# Patient Record
Sex: FEMALE | Race: WHITE | URBAN - METROPOLITAN AREA
[De-identification: names, ages, dates, MRNs, and addresses within clinical notes are randomized per-mention and may not be internally consistent; named-entity substitution may affect disease eponyms.]

---

## 2020-07-10 ENCOUNTER — OFFICE VISIT (OUTPATIENT)
Dept: URBAN - METROPOLITAN AREA SURGERY CENTER 18 | Facility: SURGERY CENTER | Age: 60
End: 2020-07-10
Payer: MEDICAID

## 2020-07-10 DIAGNOSIS — Z86.010 H/O ADENOMATOUS POLYP OF COLON: ICD-10-CM

## 2020-07-10 PROCEDURE — G8907 PT DOC NO EVENTS ON DISCHARG: HCPCS | Performed by: INTERNAL MEDICINE

## 2020-07-10 PROCEDURE — G0105 COLORECTAL SCRN; HI RISK IND: HCPCS | Performed by: INTERNAL MEDICINE

## 2020-07-10 PROCEDURE — G9936 PMH PLYP/NEO CO/RECT/JUN/ANS: HCPCS | Performed by: INTERNAL MEDICINE

## 2022-04-27 ENCOUNTER — APPOINTMENT (RX ONLY)
Dept: URBAN - METROPOLITAN AREA OTHER 9 | Facility: OTHER | Age: 62
Setting detail: DERMATOLOGY
End: 2022-04-27

## 2022-04-27 DIAGNOSIS — L72.8 OTHER FOLLICULAR CYSTS OF THE SKIN AND SUBCUTANEOUS TISSUE: ICD-10-CM

## 2022-04-27 DIAGNOSIS — L24 IRRITANT CONTACT DERMATITIS: ICD-10-CM

## 2022-04-27 PROBLEM — L24.9 IRRITANT CONTACT DERMATITIS, UNSPECIFIED CAUSE: Status: ACTIVE | Noted: 2022-04-27

## 2022-04-27 PROCEDURE — ? PRESCRIPTION MEDICATION MANAGEMENT

## 2022-04-27 PROCEDURE — ? COUNSELING

## 2022-04-27 PROCEDURE — ? OBSERVATION AND MEASURE

## 2022-04-27 PROCEDURE — ? PRESCRIPTION

## 2022-04-27 PROCEDURE — 99203 OFFICE O/P NEW LOW 30 MIN: CPT

## 2022-04-27 RX ORDER — FLUOCINONIDE 0.5 MG/G
OINTMENT TOPICAL BID
Qty: 60 | Refills: 0 | Status: ERX | COMMUNITY
Start: 2022-04-27

## 2022-04-27 RX ADMIN — FLUOCINONIDE: 0.5 OINTMENT TOPICAL at 00:00

## 2022-04-27 ASSESSMENT — LOCATION ZONE DERM
LOCATION ZONE: LEG
LOCATION ZONE: FACE

## 2022-04-27 ASSESSMENT — LOCATION DETAILED DESCRIPTION DERM
LOCATION DETAILED: RIGHT DISTAL PRETIBIAL REGION
LOCATION DETAILED: RIGHT MEDIAL ZYGOMA

## 2022-04-27 ASSESSMENT — LOCATION SIMPLE DESCRIPTION DERM
LOCATION SIMPLE: RIGHT ZYGOMA
LOCATION SIMPLE: RIGHT PRETIBIAL REGION

## 2022-04-27 NOTE — HPI: RASH
What Type Of Note Output Would You Prefer (Optional)?: Standard Output
How Severe Is Your Rash?: mild
Is This A New Presentation, Or A Follow-Up?: Rash
Additional History: Previously evaluated lesion nearby by Dr. Tolu White

## 2022-04-27 NOTE — PROCEDURE: PRESCRIPTION MEDICATION MANAGEMENT
Plan: Ice lesion when itchy. Patient instructed to keep skin cool and avoid wearing tight pants to prevent friction.
Detail Level: Zone
Initiate Treatment: Fluocinonide ointment
Render In Strict Bullet Format?: No

## 2022-04-27 NOTE — HPI: CYST
How Severe Is Your Cyst?: moderate
Is This A New Presentation, Or A Follow-Up?: Cyst
Additional History: I&D previously performed by Dr. Tolu White. Lesion has reoccurred.

## 2022-05-16 ENCOUNTER — APPOINTMENT (RX ONLY)
Dept: URBAN - METROPOLITAN AREA CLINIC 12 | Facility: CLINIC | Age: 62
Setting detail: DERMATOLOGY
End: 2022-05-16

## 2022-05-16 DIAGNOSIS — Z41.1 ENCOUNTER FOR COSMETIC SURGERY: ICD-10-CM

## 2022-05-16 DIAGNOSIS — L72.8 OTHER FOLLICULAR CYSTS OF THE SKIN AND SUBCUTANEOUS TISSUE: ICD-10-CM

## 2022-05-16 PROCEDURE — ? PATIENT SPECIFIC COUNSELING

## 2022-05-16 PROCEDURE — ? CONSULTATION - BLEPHAROPLASTY

## 2022-05-16 PROCEDURE — ? OTHER (COSMETIC)

## 2022-05-16 PROCEDURE — ? COUNSELING EXCISION - BENIGN

## 2022-05-16 ASSESSMENT — LOCATION DETAILED DESCRIPTION DERM
LOCATION DETAILED: RIGHT CENTRAL EYEBROW
LOCATION DETAILED: LEFT CENTRAL EYEBROW
LOCATION DETAILED: RIGHT INFERIOR TEMPLE

## 2022-05-16 ASSESSMENT — LOCATION SIMPLE DESCRIPTION DERM
LOCATION SIMPLE: RIGHT TEMPLE
LOCATION SIMPLE: LEFT EYEBROW
LOCATION SIMPLE: RIGHT EYEBROW

## 2022-05-16 ASSESSMENT — LOCATION ZONE DERM: LOCATION ZONE: FACE

## 2022-05-16 NOTE — PROCEDURE: PATIENT SPECIFIC COUNSELING
Other (Free Text): Patient present today for cyst removal consultation on right eye. Patient reports she has recently had a biopsy done and results came back benign. Patient reports area started with one lesion and grew into two lesions. Patient also reports she is interest in cosmetic procedures for aesthetic appearance of her eyes. Patient does wear glasses and has some dry eye. Dr. Watters examined patient and discussed procedure options. Dr. Watters explained and mentioned to patient incision site for cysts. Dr. Watters discussed cosmetic options, including blepharoplasty, brow lift. Dr. Watters did mention how a procedure for the overall skin on face and also injectables may help before jumping into surgery because patient was concerned of wrinkles. Patient understood and has no questions on healing or procedure. Radha stepped in to give the patient a quote and schedule.
Detail Level: Simple

## 2022-05-16 NOTE — PROCEDURE: CONSULTATION - BLEPHAROPLASTY
Detail Level: Detailed
Send Procedure Quote As Charge: No
Consultation Charge $ (Use Numbers Only, No Text Please.): 125

## 2022-05-16 NOTE — PROCEDURE: OTHER (COSMETIC)
Price $ (Use Numbers Only, No Text Please.): 0.00
Sticky Other (Free Text): Consultation
Detail Level: Zone

## 2022-10-18 ENCOUNTER — APPOINTMENT (RX ONLY)
Dept: URBAN - METROPOLITAN AREA OTHER 9 | Facility: OTHER | Age: 62
Setting detail: DERMATOLOGY
End: 2022-10-18

## 2022-10-18 DIAGNOSIS — L82.1 OTHER SEBORRHEIC KERATOSIS: ICD-10-CM

## 2022-10-18 DIAGNOSIS — L57.8 OTHER SKIN CHANGES DUE TO CHRONIC EXPOSURE TO NONIONIZING RADIATION: ICD-10-CM

## 2022-10-18 DIAGNOSIS — D18.0 HEMANGIOMA: ICD-10-CM

## 2022-10-18 DIAGNOSIS — D22 MELANOCYTIC NEVI: ICD-10-CM

## 2022-10-18 DIAGNOSIS — Z85.828 PERSONAL HISTORY OF OTHER MALIGNANT NEOPLASM OF SKIN: ICD-10-CM

## 2022-10-18 DIAGNOSIS — L81.4 OTHER MELANIN HYPERPIGMENTATION: ICD-10-CM

## 2022-10-18 DIAGNOSIS — L72.8 OTHER FOLLICULAR CYSTS OF THE SKIN AND SUBCUTANEOUS TISSUE: ICD-10-CM | Status: STABLE

## 2022-10-18 PROBLEM — D18.01 HEMANGIOMA OF SKIN AND SUBCUTANEOUS TISSUE: Status: ACTIVE | Noted: 2022-10-18

## 2022-10-18 PROBLEM — D22.5 MELANOCYTIC NEVI OF TRUNK: Status: ACTIVE | Noted: 2022-10-18

## 2022-10-18 PROCEDURE — ? COUNSELING

## 2022-10-18 PROCEDURE — 99213 OFFICE O/P EST LOW 20 MIN: CPT

## 2022-10-18 ASSESSMENT — LOCATION ZONE DERM
LOCATION ZONE: TRUNK
LOCATION ZONE: FACE
LOCATION ZONE: ARM
LOCATION ZONE: LEG

## 2022-10-18 ASSESSMENT — LOCATION DETAILED DESCRIPTION DERM
LOCATION DETAILED: LEFT SUPERIOR MEDIAL MALAR CHEEK
LOCATION DETAILED: RIGHT INFERIOR TEMPLE
LOCATION DETAILED: SUPERIOR LUMBAR SPINE
LOCATION DETAILED: RIGHT SUPERIOR MEDIAL UPPER BACK
LOCATION DETAILED: EPIGASTRIC SKIN
LOCATION DETAILED: RIGHT DISTAL DORSAL FOREARM
LOCATION DETAILED: INFERIOR MID FOREHEAD
LOCATION DETAILED: RIGHT DISTAL PRETIBIAL REGION

## 2022-10-18 ASSESSMENT — LOCATION SIMPLE DESCRIPTION DERM
LOCATION SIMPLE: RIGHT PRETIBIAL REGION
LOCATION SIMPLE: RIGHT UPPER BACK
LOCATION SIMPLE: LEFT CHEEK
LOCATION SIMPLE: LOWER BACK
LOCATION SIMPLE: ABDOMEN
LOCATION SIMPLE: RIGHT FOREARM
LOCATION SIMPLE: INFERIOR FOREHEAD
LOCATION SIMPLE: RIGHT TEMPLE

## 2023-03-15 ENCOUNTER — OFFICE VISIT (OUTPATIENT)
Dept: URBAN - METROPOLITAN AREA CLINIC 96 | Facility: CLINIC | Age: 63
End: 2023-03-15
Payer: MEDICAID

## 2023-03-15 ENCOUNTER — WEB ENCOUNTER (OUTPATIENT)
Dept: URBAN - METROPOLITAN AREA CLINIC 96 | Facility: CLINIC | Age: 63
End: 2023-03-15

## 2023-03-15 ENCOUNTER — LAB OUTSIDE AN ENCOUNTER (OUTPATIENT)
Dept: URBAN - METROPOLITAN AREA CLINIC 96 | Facility: CLINIC | Age: 63
End: 2023-03-15

## 2023-03-15 VITALS
TEMPERATURE: 98.4 F | WEIGHT: 125 LBS | HEIGHT: 65 IN | SYSTOLIC BLOOD PRESSURE: 127 MMHG | BODY MASS INDEX: 20.83 KG/M2 | DIASTOLIC BLOOD PRESSURE: 83 MMHG

## 2023-03-15 DIAGNOSIS — K59.00 CONSTIPATION, UNSPECIFIED CONSTIPATION TYPE: ICD-10-CM

## 2023-03-15 DIAGNOSIS — Z12.11 COLON CANCER SCREENING: ICD-10-CM

## 2023-03-15 DIAGNOSIS — K57.90 DIVERTICULOSIS: ICD-10-CM

## 2023-03-15 PROBLEM — 14760008: Status: ACTIVE | Noted: 2023-03-15

## 2023-03-15 PROBLEM — 397881000: Status: ACTIVE | Noted: 2023-03-15

## 2023-03-15 PROCEDURE — 99204 OFFICE O/P NEW MOD 45 MIN: CPT | Performed by: INTERNAL MEDICINE

## 2023-03-15 NOTE — HPI-TODAY'S VISIT:
This is a 62-year-old female referred for GI consultation and a copy will be sent to the referring provider.  Patient was previously under the care of Dr. Knight who she saw in 2017 for abdominal pain.  He had recommended possibly seeing Dr. Basilio of colorectal surgery and repeating a CT scan.  She felt that she might have diverticulitis at the time and she was treated with Augmentin but had persistent left-sided pain.  Patient had a colonoscopy done by me by direct schedule on July 10, 2020 due to history of polyps this revealed multiple sigmoid diverticula as well as internal hemorrhoids otherwise normal exam I recommended repeat exam in 5 years due to previous history of polyps. Pt fell and in a cast and chipped her foot and ligament tore. Pt says over the past month and 2 weeks she has constipation for a day or 2 and when she goes with hard stools that area ball-like. Pt saw Dr Mendel 6-7 yrs ago for rectal bleeding. Pt had colon done in 2017- saw int hem, sig tics, cecal erythema- bx normal. Pt then saw diff GI doctor- was told she had an infection- was given abx and then told if not better she needed her colon out. Pt had been fine then in October had a root canal and she was given abx- had clindamycin and she was given vancomycin after positive stool test. She took 3/4 of it and had to stop it. Pt saw the GI in Oct of 2022- and had a colon with her. Pt was positive for c diff prior to the colon and pt did have the colonoscopy anyway.

## 2023-05-19 ENCOUNTER — OFFICE VISIT (OUTPATIENT)
Dept: URBAN - METROPOLITAN AREA TELEHEALTH 2 | Facility: TELEHEALTH | Age: 63
End: 2023-05-19
Payer: MEDICAID

## 2023-05-19 ENCOUNTER — OFFICE VISIT (OUTPATIENT)
Dept: URBAN - METROPOLITAN AREA TELEHEALTH 2 | Facility: TELEHEALTH | Age: 63
End: 2023-05-19

## 2023-05-19 VITALS
WEIGHT: 125 LBS | WEIGHT: 125 LBS | BODY MASS INDEX: 20.83 KG/M2 | HEIGHT: 65 IN | HEIGHT: 65 IN | BODY MASS INDEX: 20.83 KG/M2

## 2023-05-19 DIAGNOSIS — K57.30 ACQUIRED DIVERTICULOSIS OF COLON: ICD-10-CM

## 2023-05-19 DIAGNOSIS — K59.09 CHANGE IN BOWEL MOVEMENTS INTERMITTENT CONSTIPATION. URGENCY IN THE MORNING.: ICD-10-CM

## 2023-05-19 DIAGNOSIS — Z12.11 COLON CANCER SCREENING: ICD-10-CM

## 2023-05-19 PROCEDURE — 99213 OFFICE O/P EST LOW 20 MIN: CPT | Performed by: INTERNAL MEDICINE

## 2023-05-19 NOTE — HPI-TODAY'S VISIT:
Pt has been using the Miralax daily her bowels have been moving better but still very sticky they stick to the bottom of the toilet bowel this has been happening since the fall of last year no abd pain no blood in her stool
~2 months

## 2023-05-19 NOTE — HPI-TODAY'S VISIT:
Pt feels the BM have gotten a little better the Miralax has not totally taken away the sticky feeling of the stool and it sticks to the bottom of the toilet - had it before the miralax as well has not had this in the past started to notice it more maybe in the fall of last year also had a bout with c. diff last year drinks a lot of water

## 2023-09-19 ENCOUNTER — OFFICE VISIT (OUTPATIENT)
Dept: URBAN - METROPOLITAN AREA TELEHEALTH 2 | Facility: TELEHEALTH | Age: 63
End: 2023-09-19

## 2023-09-19 ENCOUNTER — TELEPHONE ENCOUNTER (OUTPATIENT)
Dept: URBAN - METROPOLITAN AREA CLINIC 96 | Facility: CLINIC | Age: 63
End: 2023-09-19

## 2023-12-04 ENCOUNTER — OFFICE VISIT (OUTPATIENT)
Dept: URBAN - METROPOLITAN AREA TELEHEALTH 2 | Facility: TELEHEALTH | Age: 63
End: 2023-12-04
Payer: MEDICAID

## 2023-12-04 VITALS — WEIGHT: 125 LBS | BODY MASS INDEX: 20.83 KG/M2 | HEIGHT: 65 IN

## 2023-12-04 DIAGNOSIS — R14.0 ABDOMINAL BLOATING: ICD-10-CM

## 2023-12-04 DIAGNOSIS — K59.04 CHRONIC IDIOPATHIC CONSTIPATION: ICD-10-CM

## 2023-12-04 DIAGNOSIS — K57.90 DIVERTICULOSIS: ICD-10-CM

## 2023-12-04 PROBLEM — 82934008: Status: ACTIVE | Noted: 2023-12-04

## 2023-12-04 PROCEDURE — 99204 OFFICE O/P NEW MOD 45 MIN: CPT | Performed by: INTERNAL MEDICINE

## 2023-12-04 NOTE — HPI-TODAY'S VISIT:
This is a 62-year-old female referred for GI fu and a copy will be sent to the referring provider.  Patient was previously under the care of Dr. Knight who she saw in 2017 for abdominal pain. She felt that she might have diverticulitis at the time and she was treated with Augmentin but had persistent left-sided pain.  Patient had a colonoscopy done by me by direct schedule on July 10, 2020 due to history of polyps this revealed multiple sigmoid diverticula as well as internal hemorrhoids otherwise normal exam I recommended repeat exam in 5 years due to previous history of polyps. In 3/2023 she has constipation with hard stools that area ball-like. Pt saw Dr Basilio in Oct of 2022- and had a colon with her. Pt was positive for c diff prior to the colon and pt did have the colonoscopy anyway. Pt saw our PA, Orestes in 5/2023 and had added metamucil to miralax to see if this could help her constip and bulk up her c/o "sticky stools". Pt denies blood in her stools, sometimes has bloating. Pt takes metamucil daily and she goes about 5/7 days out of the week. Pt eats yogurt and steamed veggies and tries to eat healthy.

## 2023-12-07 ENCOUNTER — TELEPHONE ENCOUNTER (OUTPATIENT)
Dept: URBAN - METROPOLITAN AREA CLINIC 92 | Facility: CLINIC | Age: 63
End: 2023-12-07

## 2024-04-08 ENCOUNTER — TELEP (OUTPATIENT)
Dept: URBAN - METROPOLITAN AREA TELEHEALTH 2 | Facility: TELEHEALTH | Age: 64
End: 2024-04-08
Payer: COMMERCIAL

## 2024-04-08 ENCOUNTER — OV EP (OUTPATIENT)
Dept: URBAN - METROPOLITAN AREA CLINIC 124 | Facility: CLINIC | Age: 64
End: 2024-04-08

## 2024-04-08 VITALS — BODY MASS INDEX: 20.83 KG/M2 | WEIGHT: 125 LBS | HEIGHT: 65 IN

## 2024-04-08 DIAGNOSIS — K59.04 CHRONIC IDIOPATHIC CONSTIPATION: ICD-10-CM

## 2024-04-08 DIAGNOSIS — R14.0 ABDOMINAL BLOATING: ICD-10-CM

## 2024-04-08 DIAGNOSIS — K57.30 COLON, DIVERTICULOSIS: ICD-10-CM

## 2024-04-08 PROCEDURE — 99204 OFFICE O/P NEW MOD 45 MIN: CPT | Performed by: INTERNAL MEDICINE

## 2024-04-08 NOTE — HPI-TODAY'S VISIT:
This is a 63-year-old female referred for GI fu and a copy will be sent to the referring provider.  Patient was previously under the care of Dr. Knight who she saw in 2017 for abdominal pain. She felt that she might have diverticulitis at the time and she was treated with Augmentin but had persistent left-sided pain.  Patient had a colonoscopy done by me by direct schedule on July 10, 2020 due to history of polyps this revealed multiple sigmoid diverticula as well as internal hemorrhoids otherwise normal exam I recommended repeat exam in 5 years due to previous history of polyps. In 3/2023 she has constipation with hard stools that area ball-like. Pt saw Dr Basilio in Oct of 2022- and had a colon with her. Pt was positive for c diff prior to the colon and pt did have the colonoscopy anyway. Pt saw our PA, Orestes in 5/2023 and had added metamucil to miralax to see if this could help her constip and bulk up her c/o "sticky stools".Pt denies blood in her stools, sometimes has bloating. Pt takes metamucil daily and she goes about 5/7 days out of the week. Pt eats yogurt and steamed veggies and tries to eat healthy. Pt was last seen in Dec and was evacuating better but had some bloating. Was to add mag to regimen.

## 2024-04-08 NOTE — HPI-TODAY'S VISIT:
This is a 63-year-old female referred by Dr Elsa Gil for GI fu and a copy will be sent to the referring provider.  Patient was previously under the care of Dr. Knight who she saw in 2017 for abdominal pain. She felt that she might have diverticulitis at the time and she was treated with Augmentin but had persistent left-sided pain.  Patient had a colonoscopy done by me by direct schedule on July 10, 2020 due to history of polyps this revealed multiple sigmoid diverticula as well as internal hemorrhoids otherwise normal exam I recommended repeat exam in 5 years due to previous history of polyps. In 3/2023 she has constipation with hard stools that area ball-like. Pt saw Dr Basilio in Oct of 2022- and had a colon with her. Pt was positive for c diff prior to the colon and pt did have the colonoscopy anyway. Pt saw our PA, Orestes in 5/2023 and had added metamucil to miralax to see if this could help her constip and bulk up her c/o "sticky stools".Pt denies blood in her stools, sometimes has bloating. Pt takes metamucil daily and she goes about 5/7 days out of the week. Pt eats yogurt and steamed veggies and tries to eat healthy. Pt was last seen in Dec and was evacuating better but had some bloating. Was to add mag to regimen. Todays visit- Pt is taking florostor and she is evacuating more often. This helps with gas. Pt is going everday with her BMs now. Pt is exercising more and eating more fiber. 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  Pt drinks water well. 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 Pt seeing a neurologist for this. This was started after her swishing noise when she bent. No not having symptoms.

## 2025-07-18 ENCOUNTER — TELEPHONE ENCOUNTER (OUTPATIENT)
Dept: URBAN - METROPOLITAN AREA CLINIC 124 | Facility: CLINIC | Age: 65
End: 2025-07-18

## 2025-07-21 ENCOUNTER — LAB OUTSIDE AN ENCOUNTER (OUTPATIENT)
Dept: URBAN - METROPOLITAN AREA CLINIC 124 | Facility: CLINIC | Age: 65
End: 2025-07-21

## 2025-07-21 ENCOUNTER — OFFICE VISIT (OUTPATIENT)
Dept: URBAN - METROPOLITAN AREA CLINIC 124 | Facility: CLINIC | Age: 65
End: 2025-07-21
Payer: COMMERCIAL

## 2025-07-21 ENCOUNTER — DASHBOARD ENCOUNTERS (OUTPATIENT)
Age: 65
End: 2025-07-21

## 2025-07-21 DIAGNOSIS — K59.04 CHRONIC IDIOPATHIC CONSTIPATION: ICD-10-CM

## 2025-07-21 DIAGNOSIS — Z12.11 COLON CANCER SCREENING: ICD-10-CM

## 2025-07-21 DIAGNOSIS — K57.90 DIVERTICULOSIS: ICD-10-CM

## 2025-07-21 DIAGNOSIS — R10.84 GENERALIZED ABDOMINAL PAIN: ICD-10-CM

## 2025-07-21 PROCEDURE — 99204 OFFICE O/P NEW MOD 45 MIN: CPT | Performed by: INTERNAL MEDICINE

## 2025-07-21 PROCEDURE — 99214 OFFICE O/P EST MOD 30 MIN: CPT | Performed by: INTERNAL MEDICINE

## 2025-07-21 NOTE — HPI-TODAY'S VISIT:
This is a 65-year-old female referred by Dr Elsa Gil for GI fu and a copy will be sent to the referring provider.  Patient was previously under the care of Dr. Knight who she saw in 2017. Patient had a colonoscopy done by me by direct schedule on July 10, 2020 due to history of polyps this revealed multiple sigmoid diverticula as well as internal hemorrhoids otherwise normal exam I recommended repeat exam in 5 years due to previous history of polyps. In 3/2023 she has constipation with hard stools that area ball-like. Pt saw Dr Basilio in Oct of 2022- and had a colon with her. Pt was positive for c diff prior to the colon and pt did have the colonoscopy anyway. Pt saw our PA, Orestes in 5/2023 and had added metamucil to miralax to see if this could help her constip and bulk up her c/o "sticky stools".Pt denies blood in her stools, sometimes has bloating. Pt takes metamucil daily and she goes about 5/7 days out of the week. Pt eats yogurt and steamed veggies and tries to eat healthy. Last visit- 2024- Pt is taking florostor and she is evacuating more often. This helps with gas. Pt is going everday with her BMs now. Pt is exercising more and eating more fiber. 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  Pt drinks water well. 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 Pt seeing a neurologist for this. This was started after her swishing noise when she bent. No not having symptoms.  Todays visit-

## 2025-07-21 NOTE — HPI-TODAY'S VISIT:
Teri Swift, a 65-year-old female, came in for a chronic condition follow-up regarding abdominal bloating and pain. She described her symptoms as a general bloating sensation without stabbing pain, and at one point experienced a sharp pain on the left side. After consulting her gynecologist, she was advised to try a liquid diet one day a week, which she followed for several weeks with improvement before discontinuing. She noticed her symptoms returned after stopping the liquid diet. Teri has also increased her intake of fruits and vegetables, aiming for five servings of each daily, and feels this may contribute to her bloating. She reported improved bowel regularity, with daily, soft, and complete movements, and expressed satisfaction with her progress. She denied any stabbing pain and feels positive about her current bowel habits.

## 2025-08-04 ENCOUNTER — OFFICE VISIT (OUTPATIENT)
Dept: URBAN - METROPOLITAN AREA CLINIC 124 | Facility: CLINIC | Age: 65
End: 2025-08-04